# Patient Record
Sex: FEMALE
[De-identification: names, ages, dates, MRNs, and addresses within clinical notes are randomized per-mention and may not be internally consistent; named-entity substitution may affect disease eponyms.]

---

## 2018-01-10 ENCOUNTER — TRANSCRIPTION ENCOUNTER (OUTPATIENT)
Age: 38
End: 2018-01-10

## 2022-10-20 DIAGNOSIS — N30.90 CYSTITIS, UNSPECIFIED W/OUT HEMATURIA: ICD-10-CM

## 2022-10-20 PROBLEM — Z00.00 ENCOUNTER FOR PREVENTIVE HEALTH EXAMINATION: Status: ACTIVE | Noted: 2022-10-20

## 2023-05-05 RX ORDER — CIPROFLOXACIN HYDROCHLORIDE 500 MG/1
500 TABLET, FILM COATED ORAL DAILY
Qty: 3 | Refills: 1 | Status: ACTIVE | COMMUNITY
Start: 2022-10-20 | End: 1900-01-01

## 2023-06-26 ENCOUNTER — OFFICE (OUTPATIENT)
Dept: URBAN - METROPOLITAN AREA CLINIC 92 | Facility: CLINIC | Age: 43
Setting detail: OPHTHALMOLOGY
End: 2023-06-26
Payer: COMMERCIAL

## 2023-06-26 DIAGNOSIS — H04.123: ICD-10-CM

## 2023-06-26 DIAGNOSIS — H43.393: ICD-10-CM

## 2023-06-26 DIAGNOSIS — H52.13: ICD-10-CM

## 2023-06-26 PROCEDURE — 92014 COMPRE OPH EXAM EST PT 1/>: CPT | Performed by: SPECIALIST

## 2023-06-26 PROCEDURE — 92015 DETERMINE REFRACTIVE STATE: CPT | Performed by: SPECIALIST

## 2023-06-26 ASSESSMENT — LID EXAM ASSESSMENTS
OD_MEIBOMITIS: 1+
OS_MEIBOMITIS: 1+

## 2023-06-26 ASSESSMENT — REFRACTION_CURRENTRX
OD_AXIS: 090
OS_OVR_VA: 20/
OD_OVR_VA: 20/
OD_AXIS: 176
OD_SPHERE: -12.00
OS_OVR_VA: 20/
OS_SPHERE: -12.50
OD_SPHERE: -8.75
OS_SPHERE: -8.75
OD_OVR_VA: 20/
OD_CYLINDER: +1.00
OS_AXIS: 163
OD_CYLINDER: -1.00
OS_AXIS: 090
OS_CYLINDER: -1.00
OS_CYLINDER: +1.50

## 2023-06-26 ASSESSMENT — SUPERFICIAL PUNCTATE KERATITIS (SPK)
OD_SPK: T
OS_SPK: T

## 2023-06-26 ASSESSMENT — KERATOMETRY
OD_K2POWER_DIOPTERS: 45.25
OD_AXISANGLE_DEGREES: 111
OS_AXISANGLE_DEGREES: 100
OS_K2POWER_DIOPTERS: 45.25
METHOD_AUTO_MANUAL: AUTO
OS_K1POWER_DIOPTERS: 45.00
OD_K1POWER_DIOPTERS: 44.75

## 2023-06-26 ASSESSMENT — SPHEQUIV_DERIVED
OD_SPHEQUIV: -11.625
OD_SPHEQUIV: -10.5
OS_SPHEQUIV: -12.25
OS_SPHEQUIV: -10.5
OD_SPHEQUIV: -11

## 2023-06-26 ASSESSMENT — REFRACTION_MANIFEST
OD_AXIS: 085
OD_SPHERE: -10.00
OS_VA1: 20/25-
OD_SPHERE: -10.50
OS_AXIS: 085
OD_VA1: 20/30+
OS_CYLINDER: -1.00
OD_AXIS: 095
OS_SPHERE: -10.00
OD_CYLINDER: -1.00
OS_CYLINDER: -1.25
OD_CYLINDER: -1.00
OS_AXIS: 075

## 2023-06-26 ASSESSMENT — AXIALLENGTH_DERIVED
OS_AL: 27.6933
OD_AL: 27.7567
OD_AL: 28.029
OS_AL: 28.6658
OD_AL: 28.377

## 2023-06-26 ASSESSMENT — CONFRONTATIONAL VISUAL FIELD TEST (CVF)
OD_FINDINGS: FULL
OS_FINDINGS: FULL

## 2023-06-26 ASSESSMENT — REFRACTION_AUTOREFRACTION
OD_SPHERE: -12.25
OS_SPHERE: -12.75
OS_AXIS: 169
OS_CYLINDER: +1.00
OD_AXIS: 003
OD_CYLINDER: +1.25

## 2023-06-26 ASSESSMENT — TONOMETRY
OD_IOP_MMHG: 16
OS_IOP_MMHG: 16

## 2023-06-26 ASSESSMENT — VISUAL ACUITY
OD_BCVA: 20/20
OS_BCVA: 20/20-1

## 2023-07-25 DIAGNOSIS — J06.9 ACUTE UPPER RESPIRATORY INFECTION, UNSPECIFIED: ICD-10-CM

## 2023-07-25 RX ORDER — AZITHROMYCIN 250 MG/1
250 TABLET, FILM COATED ORAL
Qty: 1 | Refills: 1 | Status: ACTIVE | COMMUNITY
Start: 2023-07-25 | End: 1900-01-01

## 2023-08-25 ENCOUNTER — APPOINTMENT (OUTPATIENT)
Dept: NEUROLOGY | Facility: CLINIC | Age: 43
End: 2023-08-25
Payer: COMMERCIAL

## 2023-08-25 DIAGNOSIS — G43.011 MIGRAINE W/OUT AURA, INTRACTABLE, WITH STATUS MIGRAINOSUS: ICD-10-CM

## 2023-08-25 DIAGNOSIS — M54.16 RADICULOPATHY, LUMBAR REGION: ICD-10-CM

## 2023-08-25 PROCEDURE — 99202 OFFICE O/P NEW SF 15 MIN: CPT | Mod: 95

## 2023-08-25 RX ORDER — RIMEGEPANT SULFATE 75 MG/75MG
75 TABLET, ORALLY DISINTEGRATING ORAL DAILY
Qty: 8 | Refills: 5 | Status: ACTIVE | COMMUNITY
Start: 2023-08-25 | End: 1900-01-01

## 2023-08-25 NOTE — PLAN
[FreeTextEntry1] : ELOISA BRANNON is a 42 year old woman being seen via tele health visit for interactable migraine headache without aura and worsening chronic intermittent low back pain. Since low back pain did not improve for past month despite of home PT will obtain MRI L spine for further assessment. Will also start insurance approval for Nurtec since she reported poor response and side effect of palpitatoin and jaw pain and chest discomfort to two different triptans.   - Start Nurtec PRN - Lumbar spine w/o  - Magnesium daily  - RTC in 3 months

## 2023-08-25 NOTE — HISTORY OF PRESENT ILLNESS
[Home] : at home, [unfilled] , at the time of the visit. [Medical Office: ([facility] [place])___] : at the medical office located in  [Verbal consent obtained from patient] : the patient, [unfilled] [Time Spent: ___ minutes] : I have spent [unfilled] minutes with the patient on the telephone [FreeTextEntry1] : Visit was done via tele medicine and face to face connection was obtained via [facility] [facility].  ELOISA BRANNON is a 42-year-old woman with no significant past medical history of OCP is being seen as a new patient for chronic worsening back pain and migraine headache. She started to have migraine headaches since early 20s. She used to take an abortive medication called Almotriptan but it stopped working after a while. Few years ago, she started to take Imitrex, but it gave her side effect of severe jaw pain, palpitations and chest discomfort. She now uses Exendine migraine, but it also gives her heart palpitations. She now reports migraine headaches every 10 days to every two weeks. Migraine started from back of her head and neck and travels to forehead and behind her eyes associated with light, sound sensitivity nausea, blurred vision and sometime imbalance.  In addition, she reports worsening low back pain. She started to have intermittent low back pain since 2015. About once or twice a year would cause severe pain causing her to move for few days. Starts from low back and travels down the legs to the feet worse in the left side. It is sometimes associated with paresthesia in the feet. No incontinence. Recent back pain started about one month ago and has not improved despite of home PT and stretch exercises.

## 2024-09-13 ENCOUNTER — OFFICE (OUTPATIENT)
Dept: URBAN - METROPOLITAN AREA CLINIC 28 | Facility: CLINIC | Age: 44
Setting detail: OPHTHALMOLOGY
End: 2024-09-13
Payer: COMMERCIAL
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
## 2024-09-13 DIAGNOSIS — H04.123: ICD-10-CM

## 2024-09-13 DIAGNOSIS — H02.89: ICD-10-CM

## 2024-09-13 PROCEDURE — 92015 DETERMINE REFRACTIVE STATE: CPT | Performed by: SPECIALIST

## 2024-09-13 PROCEDURE — 92012 INTRM OPH EXAM EST PATIENT: CPT | Performed by: SPECIALIST

## 2024-09-13 ASSESSMENT — LID EXAM ASSESSMENTS
OS_MEIBOMITIS: LUL 1+ 2+
OD_MEIBOMITIS: RUL 1+ 2+

## 2024-09-13 ASSESSMENT — CONFRONTATIONAL VISUAL FIELD TEST (CVF)
OS_FINDINGS: FULL
OD_FINDINGS: FULL

## 2025-07-18 ENCOUNTER — OFFICE (OUTPATIENT)
Dept: URBAN - METROPOLITAN AREA CLINIC 92 | Facility: CLINIC | Age: 45
Setting detail: OPHTHALMOLOGY
End: 2025-07-18
Payer: COMMERCIAL

## 2025-07-18 DIAGNOSIS — H43.393: ICD-10-CM

## 2025-07-18 DIAGNOSIS — H02.89: ICD-10-CM

## 2025-07-18 DIAGNOSIS — H16.223: ICD-10-CM

## 2025-07-18 DIAGNOSIS — D31.32: ICD-10-CM

## 2025-07-18 PROCEDURE — 92014 COMPRE OPH EXAM EST PT 1/>: CPT | Performed by: SPECIALIST

## 2025-07-18 ASSESSMENT — LID EXAM ASSESSMENTS
OD_MEIBOMITIS: RUL 1+ 2+
OS_MEIBOMITIS: LUL 1+ 2+

## 2025-07-18 ASSESSMENT — REFRACTION_CURRENTRX
OD_AXIS: 087
OS_SPHERE: -10.00
OD_CYLINDER: +1.00
OD_CYLINDER: -1.00
OS_CYLINDER: -1.00
OS_AXIS: 163
OS_AXIS: 085
OD_SPHERE: -10.00
OD_OVR_VA: 20/
OD_VPRISM_DIRECTION: SV
OS_OVR_VA: 20/
OD_AXIS: 176
OS_CYLINDER: +1.50
OD_SPHERE: -12.00
OS_SPHERE: -12.50
OS_OVR_VA: 20/
OS_VPRISM_DIRECTION: SV
OD_OVR_VA: 20/

## 2025-07-18 ASSESSMENT — KERATOMETRY
OS_AXISANGLE_DEGREES: 092
METHOD_AUTO_MANUAL: AUTO
OD_K2POWER_DIOPTERS: 45.00
OD_K1POWER_DIOPTERS: 44.75
OS_K1POWER_DIOPTERS: 44.00
OD_AXISANGLE_DEGREES: 128
OS_K2POWER_DIOPTERS: 44.75

## 2025-07-18 ASSESSMENT — REFRACTION_AUTOREFRACTION
OD_SPHERE: -11.75
OD_AXIS: 002
OS_AXIS: 172
OS_CYLINDER: +1.25
OD_CYLINDER: +1.25
OS_SPHERE: -12.50

## 2025-07-18 ASSESSMENT — REFRACTION_MANIFEST
OS_VA1: 20/25-
OS_AXIS: 075
OD_VA1: 20/30+
OD_SPHERE: -10.50
OS_CYLINDER: -1.25
OS_SPHERE: -10.00
OD_AXIS: 095
OS_CYLINDER: -1.00
OD_SPHERE: -10.00
OD_CYLINDER: -1.00
OS_AXIS: 085
OD_CYLINDER: -1.00
OD_AXIS: 085

## 2025-07-18 ASSESSMENT — CONFRONTATIONAL VISUAL FIELD TEST (CVF)
OS_FINDINGS: FULL
OD_FINDINGS: FULL

## 2025-07-18 ASSESSMENT — SUPERFICIAL PUNCTATE KERATITIS (SPK)
OD_SPK: T
OS_SPK: T

## 2025-07-18 ASSESSMENT — VISUAL ACUITY
OD_BCVA: 20/20-2
OS_BCVA: 20/25+2